# Patient Record
Sex: MALE | Race: WHITE | HISPANIC OR LATINO
[De-identification: names, ages, dates, MRNs, and addresses within clinical notes are randomized per-mention and may not be internally consistent; named-entity substitution may affect disease eponyms.]

---

## 2021-07-07 PROBLEM — Z00.00 ENCOUNTER FOR PREVENTIVE HEALTH EXAMINATION: Status: ACTIVE | Noted: 2021-07-07

## 2021-07-09 ENCOUNTER — APPOINTMENT (OUTPATIENT)
Dept: ORTHOPEDIC SURGERY | Facility: CLINIC | Age: 36
End: 2021-07-09

## 2021-08-06 ENCOUNTER — APPOINTMENT (OUTPATIENT)
Dept: ORTHOPEDIC SURGERY | Facility: CLINIC | Age: 36
End: 2021-08-06
Payer: COMMERCIAL

## 2021-08-06 VITALS — HEIGHT: 66 IN | RESPIRATION RATE: 16 BRPM | WEIGHT: 185 LBS | BODY MASS INDEX: 29.73 KG/M2

## 2021-08-06 DIAGNOSIS — Z78.9 OTHER SPECIFIED HEALTH STATUS: ICD-10-CM

## 2021-08-06 DIAGNOSIS — Z56.0 UNEMPLOYMENT, UNSPECIFIED: ICD-10-CM

## 2021-08-06 PROCEDURE — 99203 OFFICE O/P NEW LOW 30 MIN: CPT

## 2021-08-06 PROCEDURE — 73610 X-RAY EXAM OF ANKLE: CPT | Mod: 50

## 2021-08-06 PROCEDURE — 73630 X-RAY EXAM OF FOOT: CPT | Mod: 50

## 2021-08-06 RX ORDER — GABAPENTIN 100 MG/1
100 CAPSULE ORAL
Refills: 0 | Status: ACTIVE | COMMUNITY

## 2021-08-06 SDOH — ECONOMIC STABILITY - INCOME SECURITY: UNEMPLOYMENT, UNSPECIFIED: Z56.0

## 2021-08-16 ENCOUNTER — TRANSCRIPTION ENCOUNTER (OUTPATIENT)
Age: 36
End: 2021-08-16

## 2021-08-16 ENCOUNTER — NON-APPOINTMENT (OUTPATIENT)
Age: 36
End: 2021-08-16

## 2021-08-16 ENCOUNTER — APPOINTMENT (OUTPATIENT)
Dept: RHEUMATOLOGY | Facility: CLINIC | Age: 36
End: 2021-08-16
Payer: COMMERCIAL

## 2021-08-16 VITALS
WEIGHT: 184 LBS | DIASTOLIC BLOOD PRESSURE: 76 MMHG | HEART RATE: 64 BPM | SYSTOLIC BLOOD PRESSURE: 115 MMHG | HEIGHT: 66 IN | OXYGEN SATURATION: 98 % | TEMPERATURE: 98.1 F | BODY MASS INDEX: 29.57 KG/M2

## 2021-08-16 PROCEDURE — 36415 COLL VENOUS BLD VENIPUNCTURE: CPT

## 2021-08-16 PROCEDURE — 99204 OFFICE O/P NEW MOD 45 MIN: CPT | Mod: 25

## 2021-08-16 NOTE — DATA REVIEWED
[FreeTextEntry1] : MRI\par EMG normal\par \par Imagin2021 Weightbearing X-rays three-views bilateral foot and ankle ordered, obtained at [facility] and interpreted by me today and reviewed with the patient showing:\par Osteochondral defect of the medial talar dome of the right ankle with cystic degeneration without overlying soft tissue swelling\par No fracture subluxations or dislocations\par Pes planus bilaterally\par \par \par MRI [w/o contrast] of left ankle performed at [R  } on 21 were interpreted by me today and reviewed with the patient showing:\par Mild plantar fasciitis\par Low-grade tearing of the posterior tibial tendon with associated tenosynovitis at the insertion\par . \par

## 2021-08-16 NOTE — REVIEW OF SYSTEMS
[Arthralgias] : arthralgias [Negative] : Heme/Lymph [Joint Swelling] : no joint swelling [Skin Lesions] : no skin lesions

## 2021-08-16 NOTE — PHYSICAL EXAM
[General Appearance - Alert] : alert [General Appearance - In No Acute Distress] : in no acute distress [General Appearance - Well Nourished] : well nourished [General Appearance - Well Developed] : well developed [General Appearance - Well-Appearing] : healthy appearing [Sclera] : the sclera and conjunctiva were normal [Respiration, Rhythm And Depth] : normal respiratory rhythm and effort [Exaggerated Use Of Accessory Muscles For Inspiration] : no accessory muscle use [Edema] : there was no peripheral edema [Nail Clubbing] : no clubbing  or cyanosis of the fingernails [Musculoskeletal - Swelling] : no joint swelling seen [Motor Tone] : muscle strength and tone were normal [] : no rash [Skin Lesions] : no skin lesions [Oriented To Time, Place, And Person] : oriented to person, place, and time [Affect] : the affect was normal [Impaired Insight] : insight and judgment were intact [FreeTextEntry1] : tenderness over the base of the calcaneous bilaterally.  No active synovitis of the upper and lower extremities bilaterally.

## 2021-08-16 NOTE — ASSESSMENT
[FreeTextEntry1] : 36 year old man referred by orthopedist for rheumatology evaluation. Patient with bilateral plantar fasciitis refractory to multiple treatment modalities, including but not limited to PT, steroid injections, night braces, CAM boot, NSAIDs, and PRP injections.  At this time, patient does not appear to meet criteria for an underlying inflammatory arthritis, but will check labs today for further evaluation.  Will check CBC, CMP, ESR, CRP, RF, CCP, and HLA B27..  Patient was referred for acupuncture and discussed possible duloxetine if blood test are unrevealing.

## 2021-08-16 NOTE — HISTORY OF PRESENT ILLNESS
[FreeTextEntry1] : 36 year old male community ambulator presents for an evaluation of bilateral ankle pain that has been ongoing for the past 11 months without any inciting injuries. He presents WB in regular shoes and reports that pain initially started in his left heel and then a few months later in the right heel. He adds that pain is currently an 8/10 in severity. He was see by [facility] where he had x-rays done and MRI of the left ankle. He also had that he had 3 PRP injections in each lower extremities with temporary relief in discomfort. Pt states that he has been going to [facility]. Pain increases with prolonged standing and walking.\par **He states that he is a  but is not currently working. \par \par october \par left then right\par \par rest, NSAID, night bracing\par cam walker\par PT\par cortisone\par PRP in both feet\par Feels more aggravated\par Had second opinion\par Three MRI, all about the same\par EMG normal\par \par Tried in last two months, meloxicam did not help\par Gabapentin just made drowsy without benefit\par Now aleve, twice 220 bid, which does not help\par \par Try again to PT, accupuncture, shock wave therapy\par \par Custom orthotics, five different shoes, sneakers\par Past month, felt some falling asleep sensation \par \par Feels some pain on the medial ankle\par Nothing really makes it feel better, icing helps a little bit\par Not working for three months and does not feel any better if anything maybe worse\par \par Had carpal tunnel surgery June 1\par Still with some pain in the 4th and 5th fingers and elbow positioning\par \par No psoriasis\par no family isitory of psoriasis\par No RA\par Sister with SLe\par No family history of IBD\par No personal history of IB, no GI symptoms\par No gonorrhea or chlamydia\par No history of uveitits or iritis\par \par no recent blood tests \par No benefit from antiinflammatories in past\par First cortisone injection helped but otherwise no real benedit\par PT helped the first time\par \par No night bracing at this time, did not help initially\par Not exercising at this time, not since last September\par

## 2021-08-17 LAB
ALBUMIN SERPL ELPH-MCNC: 4.8 G/DL
ALP BLD-CCNC: 61 U/L
ALT SERPL-CCNC: 24 U/L
ANION GAP SERPL CALC-SCNC: 15 MMOL/L
AST SERPL-CCNC: 27 U/L
BASOPHILS # BLD AUTO: 0.03 K/UL
BASOPHILS NFR BLD AUTO: 0.6 %
BILIRUB SERPL-MCNC: 0.3 MG/DL
BUN SERPL-MCNC: 20 MG/DL
CALCIUM SERPL-MCNC: 9.5 MG/DL
CCP AB SER IA-ACNC: <8 UNITS
CHLORIDE SERPL-SCNC: 105 MMOL/L
CO2 SERPL-SCNC: 23 MMOL/L
CREAT SERPL-MCNC: 0.92 MG/DL
CRP SERPL-MCNC: <3 MG/L
EOSINOPHIL # BLD AUTO: 0.1 K/UL
EOSINOPHIL NFR BLD AUTO: 2.1 %
ERYTHROCYTE [SEDIMENTATION RATE] IN BLOOD BY WESTERGREN METHOD: 4 MM/HR
GLUCOSE SERPL-MCNC: 93 MG/DL
HCT VFR BLD CALC: 47.6 %
HGB BLD-MCNC: 16 G/DL
IMM GRANULOCYTES NFR BLD AUTO: 0.4 %
LYMPHOCYTES # BLD AUTO: 1.13 K/UL
LYMPHOCYTES NFR BLD AUTO: 23.8 %
MAN DIFF?: NORMAL
MCHC RBC-ENTMCNC: 29.7 PG
MCHC RBC-ENTMCNC: 33.6 GM/DL
MCV RBC AUTO: 88.5 FL
MONOCYTES # BLD AUTO: 0.4 K/UL
MONOCYTES NFR BLD AUTO: 8.4 %
NEUTROPHILS # BLD AUTO: 3.07 K/UL
NEUTROPHILS NFR BLD AUTO: 64.7 %
PLATELET # BLD AUTO: 257 K/UL
POTASSIUM SERPL-SCNC: 4.8 MMOL/L
PROT SERPL-MCNC: 7.4 G/DL
RBC # BLD: 5.38 M/UL
RBC # FLD: 14.1 %
RF+CCP IGG SER-IMP: NEGATIVE
RHEUMATOID FACT SER QL: <10 IU/ML
SODIUM SERPL-SCNC: 143 MMOL/L
WBC # FLD AUTO: 4.75 K/UL

## 2021-08-23 LAB — HLA-B27 RELATED AG QL: NEGATIVE

## 2021-08-25 ENCOUNTER — NON-APPOINTMENT (OUTPATIENT)
Age: 36
End: 2021-08-25

## 2021-10-12 ENCOUNTER — NON-APPOINTMENT (OUTPATIENT)
Age: 36
End: 2021-10-12

## 2021-10-29 ENCOUNTER — APPOINTMENT (OUTPATIENT)
Dept: ORTHOPEDIC SURGERY | Facility: CLINIC | Age: 36
End: 2021-10-29
Payer: COMMERCIAL

## 2021-10-29 VITALS — RESPIRATION RATE: 16 BRPM | BODY MASS INDEX: 29.57 KG/M2 | HEIGHT: 66 IN | WEIGHT: 184 LBS

## 2021-10-29 DIAGNOSIS — M79.641 PAIN IN RIGHT HAND: ICD-10-CM

## 2021-10-29 DIAGNOSIS — M76.822 POSTERIOR TIBIAL TENDINITIS, LEFT LEG: ICD-10-CM

## 2021-10-29 DIAGNOSIS — M79.642 PAIN IN LEFT HAND: ICD-10-CM

## 2021-10-29 PROCEDURE — 99214 OFFICE O/P EST MOD 30 MIN: CPT

## 2021-12-27 ENCOUNTER — APPOINTMENT (OUTPATIENT)
Dept: ORTHOPEDIC SURGERY | Facility: CLINIC | Age: 36
End: 2021-12-27
Payer: COMMERCIAL

## 2021-12-27 VITALS — RESPIRATION RATE: 16 BRPM | HEIGHT: 66 IN | BODY MASS INDEX: 29.57 KG/M2 | WEIGHT: 184 LBS

## 2021-12-27 DIAGNOSIS — M79.671 PAIN IN RIGHT LEG: ICD-10-CM

## 2021-12-27 DIAGNOSIS — M79.672 PAIN IN RIGHT LEG: ICD-10-CM

## 2021-12-27 DIAGNOSIS — M79.604 PAIN IN RIGHT LEG: ICD-10-CM

## 2021-12-27 DIAGNOSIS — M21.41 FLAT FOOT [PES PLANUS] (ACQUIRED), RIGHT FOOT: ICD-10-CM

## 2021-12-27 DIAGNOSIS — M79.605 PAIN IN RIGHT LEG: ICD-10-CM

## 2021-12-27 DIAGNOSIS — M21.42 FLAT FOOT [PES PLANUS] (ACQUIRED), RIGHT FOOT: ICD-10-CM

## 2021-12-27 PROCEDURE — 99213 OFFICE O/P EST LOW 20 MIN: CPT

## 2022-01-04 ENCOUNTER — APPOINTMENT (OUTPATIENT)
Dept: ORTHOPEDIC SURGERY | Facility: CLINIC | Age: 37
End: 2022-01-04
Payer: COMMERCIAL

## 2022-01-04 VITALS — WEIGHT: 184 LBS | HEIGHT: 66 IN | BODY MASS INDEX: 29.57 KG/M2

## 2022-01-04 DIAGNOSIS — M25.571 PAIN IN RIGHT ANKLE AND JOINTS OF RIGHT FOOT: ICD-10-CM

## 2022-01-04 DIAGNOSIS — M62.89 OTHER SPECIFIED DISORDERS OF MUSCLE: ICD-10-CM

## 2022-01-04 DIAGNOSIS — M25.572 PAIN IN RIGHT ANKLE AND JOINTS OF RIGHT FOOT: ICD-10-CM

## 2022-01-04 DIAGNOSIS — M72.2 PLANTAR FASCIAL FIBROMATOSIS: ICD-10-CM

## 2022-01-04 DIAGNOSIS — M21.42 FLAT FOOT [PES PLANUS] (ACQUIRED), RIGHT FOOT: ICD-10-CM

## 2022-01-04 DIAGNOSIS — M77.8 OTHER ENTHESOPATHIES, NOT ELSEWHERE CLASSIFIED: ICD-10-CM

## 2022-01-04 DIAGNOSIS — M21.41 FLAT FOOT [PES PLANUS] (ACQUIRED), RIGHT FOOT: ICD-10-CM

## 2022-01-04 PROCEDURE — 73600 X-RAY EXAM OF ANKLE: CPT | Mod: LT

## 2022-01-04 PROCEDURE — 73630 X-RAY EXAM OF FOOT: CPT | Mod: 50

## 2022-01-04 PROCEDURE — 99214 OFFICE O/P EST MOD 30 MIN: CPT

## 2022-01-10 PROBLEM — M77.8 ENTHESOPATHY OF FOOT: Status: ACTIVE | Noted: 2022-01-10

## 2022-01-10 PROBLEM — M72.2 PLANTAR FASCIITIS OF LEFT FOOT: Status: ACTIVE | Noted: 2021-08-06

## 2022-01-10 PROBLEM — M21.41 ACQUIRED PES PLANUS OF BOTH FEET: Status: ACTIVE | Noted: 2022-01-10

## 2022-01-10 PROBLEM — M62.89 TIGHTNESS OF BOTH GASTROCNEMIUS MUSCLES: Status: ACTIVE | Noted: 2022-01-10

## 2022-01-10 PROBLEM — M72.2 PLANTAR FASCIITIS OF RIGHT FOOT: Status: ACTIVE | Noted: 2021-08-06

## 2022-01-10 PROBLEM — M25.571 BILATERAL ANKLE PAIN: Status: ACTIVE | Noted: 2022-01-04

## 2022-01-10 NOTE — DISCUSSION/SUMMARY
[de-identified] : Discussed with patient nature of condition, potential course / sequelae, options reviewed.  NB shoe wear, gel heel cups, night splint immobilization L LE, calf stretching exercises and HEP.  Educational handout provided.  Return to office PRN / second opinion protocol.  All questions answered.

## 2022-01-10 NOTE — PHYSICAL EXAM
[de-identified] : Extremity: +Equinus (releases) B LE, +PPAV B feet (supple), able to perform B SLHR testing, minimal / variable tenderness plantar fascia origin L hindfoot, nontender L PTT insertional.  Nontender B ankles, peroneals, syndesmosis, Achilles, hindfoot ST, midfoot LF and forefoot.  Stable Drawer testing B ankles, 5 / 5 evertor strength B ankles, calves soft and nontender, sensorimotor unchanged, skin as aforementioned B LE.  AOx3, mood / affect normal. [de-identified] : MRI Left Ankle - 07/24/2021 (by report)\par Impression:\par 1. Mild plantar fasciitis with superimposed low-grade tear at the central cord origin without significant change.\par 2. Focal low-grade interstitial tear of the posterior tibialis tendon. Mild posterior tibialis tenosynovitis.\par \par MRI (by report 11/25/20):  moderate proximal plantar fasciitis with low-grade partial tear of the central band with mild active plantar calcaneal enthesitis.  Mild tibialis posterior tendinosis and tenosynovitis, mild chronic Achilles tendinosis, no tendon tear identified.  Nonspecific sprain of the deep and superficial deltoid ligaments L ankle.\par \par MRI (by report 4/27/21): moderate acute on chronic plantar fasciitis with stable low-grade insertional tear of the central cord origin at the enthesis and mild associated reactive osseous and soft tissue inflammatory change, mild tibialis posterior tendinosis and tenosynovitis, scarring of the deltoid ligament from prior injury L ankle.\par \par EMG / NCS (by report 7/6/21): negative study B LE, no evidence of posterior tibial neuropathy / tarsal tunnel syndrome.\par \par Radiographs (3v B feet and 2v L ankle) reveal PTS B feet, small plantar calcaneal enthesophyte L hindfoot.

## 2022-01-10 NOTE — HISTORY OF PRESENT ILLNESS
[FreeTextEntry1] : Patient is a 36 year-old male who presents to the office today for initial evaluation of bilateral ankle and heel pain. He had previously worked as a , although unemployed for past 7 months. He states that the pain originated in the left foot only in September 2020. He denies any specific injury, but does note that he had left heel pain that insidiously began due to significant walking, working out, and working. He was initially worked up by another physician and was treated for 2-3 months in a CAM boot. This did not help his pain, he wound up getting a cortisone injection, which did give some help. He began to have pain in the right foot beginning of March 2021. He has been treated for intractable plantar fasciitis. He had a second cortisone injection on the left foot when he had atrophy around the injection site. He wound up having 3 PRP injections into both feet over a 1 month period of time. His symptoms got worse 6-8 weeks after the PRP. He now continues to have pain on the medial aspect of the heel on the left, as well as sharp pain on the plantar aspect of the left and right foot. The left is still significantly worse than the right. Has been evaluated by rheumatology for polyarthralgia.  [During physical examination, patient inquired whether potential ST contour irregularity on plantar aspect L foot was actually ganglion cyst].

## 2022-02-08 PROBLEM — M21.41 PES PLANUS OF BOTH FEET: Status: ACTIVE | Noted: 2021-07-08

## 2022-02-08 PROBLEM — M79.604 BILATERAL PAIN OF LEG AND FOOT: Status: ACTIVE | Noted: 2021-12-27

## 2022-04-05 NOTE — REVIEW OF SYSTEMS
Cimetidine Counseling:  I discussed with the patient the risks of Cimetidine including but not limited to gynecomastia, headache, diarrhea, nausea, drowsiness, arrhythmias, pancreatitis, skin rashes, psychosis, bone marrow suppression and kidney toxicity. [Negative] : Heme/Lymph [FreeTextEntry9] : Bilateral Ankle and Heel Pain